# Patient Record
Sex: MALE | Race: BLACK OR AFRICAN AMERICAN
[De-identification: names, ages, dates, MRNs, and addresses within clinical notes are randomized per-mention and may not be internally consistent; named-entity substitution may affect disease eponyms.]

---

## 2020-03-10 ENCOUNTER — HOSPITAL ENCOUNTER (EMERGENCY)
Dept: HOSPITAL 61 - ER | Age: 60
Discharge: HOME | End: 2020-03-10
Payer: MEDICARE

## 2020-03-10 VITALS — DIASTOLIC BLOOD PRESSURE: 100 MMHG | SYSTOLIC BLOOD PRESSURE: 149 MMHG

## 2020-03-10 VITALS — WEIGHT: 195.33 LBS | BODY MASS INDEX: 27.35 KG/M2 | HEIGHT: 71 IN

## 2020-03-10 DIAGNOSIS — Z88.8: ICD-10-CM

## 2020-03-10 DIAGNOSIS — E87.6: ICD-10-CM

## 2020-03-10 DIAGNOSIS — K92.1: ICD-10-CM

## 2020-03-10 DIAGNOSIS — R19.7: Primary | ICD-10-CM

## 2020-03-10 DIAGNOSIS — Z88.5: ICD-10-CM

## 2020-03-10 DIAGNOSIS — R09.81: ICD-10-CM

## 2020-03-10 LAB
ALBUMIN SERPL-MCNC: 3.2 G/DL (ref 3.4–5)
ALBUMIN/GLOB SERPL: 0.8 {RATIO} (ref 1–1.7)
ALP SERPL-CCNC: 65 U/L (ref 46–116)
ALT SERPL-CCNC: 50 U/L (ref 16–63)
ANION GAP SERPL CALC-SCNC: 10 MMOL/L (ref 6–14)
AST SERPL-CCNC: 50 U/L (ref 15–37)
BASOPHILS # BLD AUTO: 0 X10^3/UL (ref 0–0.2)
BASOPHILS NFR BLD: 0 % (ref 0–3)
BILIRUB SERPL-MCNC: 0.8 MG/DL (ref 0.2–1)
BUN SERPL-MCNC: 13 MG/DL (ref 8–26)
BUN/CREAT SERPL: 14 (ref 6–20)
CALCIUM SERPL-MCNC: 9 MG/DL (ref 8.5–10.1)
CHLORIDE SERPL-SCNC: 101 MMOL/L (ref 98–107)
CO2 SERPL-SCNC: 29 MMOL/L (ref 21–32)
CREAT SERPL-MCNC: 0.9 MG/DL (ref 0.7–1.3)
EOSINOPHIL NFR BLD: 0 % (ref 0–3)
EOSINOPHIL NFR BLD: 0 X10^3/UL (ref 0–0.7)
ERYTHROCYTE [DISTWIDTH] IN BLOOD BY AUTOMATED COUNT: 13.5 % (ref 11.5–14.5)
GFR SERPLBLD BASED ON 1.73 SQ M-ARVRAT: 104.2 ML/MIN
GLOBULIN SER-MCNC: 3.8 G/DL (ref 2.2–3.8)
GLUCOSE SERPL-MCNC: 103 MG/DL (ref 70–99)
HCT VFR BLD CALC: 37.4 % (ref 39–53)
HGB BLD-MCNC: 12.7 G/DL (ref 13–17.5)
LIPASE: 123 U/L (ref 73–393)
LYMPHOCYTES # BLD: 1 X10^3/UL (ref 1–4.8)
LYMPHOCYTES NFR BLD AUTO: 22 % (ref 24–48)
MCH RBC QN AUTO: 33 PG (ref 25–35)
MCHC RBC AUTO-ENTMCNC: 34 G/DL (ref 31–37)
MCV RBC AUTO: 97 FL (ref 79–100)
MONO #: 0.7 X10^3/UL (ref 0–1.1)
MONOCYTES NFR BLD: 17 % (ref 0–9)
NEUT #: 2.7 X10^3/UL (ref 1.8–7.7)
NEUTROPHILS NFR BLD AUTO: 61 % (ref 31–73)
PLATELET # BLD AUTO: 140 X10^3/UL (ref 140–400)
POTASSIUM SERPL-SCNC: 3.3 MMOL/L (ref 3.5–5.1)
PROT SERPL-MCNC: 7 G/DL (ref 6.4–8.2)
RBC # BLD AUTO: 3.84 X10^6/UL (ref 4.3–5.7)
SODIUM SERPL-SCNC: 140 MMOL/L (ref 136–145)
WBC # BLD AUTO: 4.4 X10^3/UL (ref 4–11)

## 2020-03-10 PROCEDURE — 71046 X-RAY EXAM CHEST 2 VIEWS: CPT

## 2020-03-10 PROCEDURE — 84484 ASSAY OF TROPONIN QUANT: CPT

## 2020-03-10 PROCEDURE — 36415 COLL VENOUS BLD VENIPUNCTURE: CPT

## 2020-03-10 PROCEDURE — 80053 COMPREHEN METABOLIC PANEL: CPT

## 2020-03-10 PROCEDURE — 85025 COMPLETE CBC W/AUTO DIFF WBC: CPT

## 2020-03-10 PROCEDURE — 83690 ASSAY OF LIPASE: CPT

## 2020-03-10 PROCEDURE — 99284 EMERGENCY DEPT VISIT MOD MDM: CPT

## 2020-03-10 NOTE — PHYS DOC
Adult General


Chief Complaint


Chief Complaint:  DIARRHEA





HPI


HPI





Patient is a 59  year old male who presents with upper respiratory symptoms for 

the last week. Patient states that he started to have some bloody stools 2 week 

ago, which lasted 1 day. States he has not had any further episodes since that 

time. States he is continued has some soft stool, with 12 episodes of diarrhea 

each day. Denies any purulence, denies any blood in his stools. Denies abdominal

pain. Does report he has had heavy EtOH use for the last several years. States 

last drink was yesterday. States he's had some sinus congestion and a cough for 

the last week as well, he had taken Benadryl last night but nothing else. Denies

any shortness of breath, denies any dizziness, denies any chest pain, denies any

abdominal pain, denies any vomiting or nausea.





Review of Systems


Review of Systems





Constitutional: Denies fever or chills []





HENT: Reports nasal congestion denies sore throat []


Respiratory:  Reports cough denies shortness of breath []


Cardiovascular: No additional information not addressed in HPI []


GI: Denies abdominal pain, nausea, vomiting, reports 1 episode of bloody stool 2

 weeks ago, reports diarrhea for the last week as well.[]


: Denies dysuria or hematuria []


Musculoskeletal: Denies back pain or joint pain []


Integument: Denies rash or skin lesions []


Neurologic: Denies headache, focal weakness or sensory changes []


Endocrine: Denies polyuria or polydipsia []





All other systems were reviewed and found to be within normal limits, except as 

documented in this note.





Allergies


Allergies





Allergies








Coded Allergies Type Severity Reaction Last Updated Verified


 


  lorazepam Allergy Severe Anaphylaxis 3/10/20 Yes


 


  morphine Allergy Mild hives 3/10/20 Yes











Physical Exam


Physical Exam





Constitutional: Well developed, well nourished, no acute distress, non-toxic 

appearance. []


HENT: Normocephalic, atraumatic, bilateral external ears normal, oropharynx 

moist, no oral exudates, nose normal. Tonsils 0 []


Eyes: PERRLA, EOMI, conjunctiva normal, no discharge. [] 


Neck: Normal range of motion, no tenderness, supple, no stridor. [] 


Cardiovascular:Heart rate regular rhythm, no murmur []


Lungs & Thorax:  Bilateral breath sounds clear to auscultation []


Abdomen: Bowel sounds normal, soft, no tenderness, no masses, no pulsatile adiel

s. Minimal hepatomegaly noted [] 


Skin: Warm, dry, no erythema, no rash. [] 


Back: No tenderness, no CVA tenderness. [] 


Extremities: No tenderness, no cyanosis, no clubbing, ROM intact, no edema. [] 


Neurologic: Alert and oriented X 3, normal motor function, normal sensory 

function, no focal deficits noted. []


Psychologic: Affect normal, judgement normal, mood normal. []





Current Patient Data


Vital Signs





                                   Vital Signs








  Date Time  Temp Pulse Resp B/P (MAP) Pulse Ox O2 Delivery O2 Flow Rate FiO2


 


3/10/20 08:55 98.3 89 18 163/98 (119) 96 Room Air  





 98.3       








Lab Values





                                Laboratory Tests








Test


 3/10/20


09:25


 


White Blood Count


 4.4 x10^3/uL


(4.0-11.0)


 


Red Blood Count


 3.84 x10^6/uL


(4.30-5.70)  L


 


Hemoglobin


 12.7 g/dL


(13.0-17.5)  L


 


Hematocrit


 37.4 %


(39.0-53.0)  L


 


Mean Corpuscular Volume


 97 fL ()





 


Mean Corpuscular Hemoglobin 33 pg (25-35)  


 


Mean Corpuscular Hemoglobin


Concent 34 g/dL


(31-37)


 


Red Cell Distribution Width


 13.5 %


(11.5-14.5)


 


Platelet Count


 140 x10^3/uL


(140-400)


 


Neutrophils (%) (Auto) 61 % (31-73)  


 


Lymphocytes (%) (Auto) 22 % (24-48)  L


 


Monocytes (%) (Auto) 17 % (0-9)  H


 


Eosinophils (%) (Auto) 0 % (0-3)  


 


Basophils (%) (Auto) 0 % (0-3)  


 


Neutrophils # (Auto)


 2.7 x10^3/uL


(1.8-7.7)


 


Lymphocytes # (Auto)


 1.0 x10^3/uL


(1.0-4.8)


 


Monocytes # (Auto)


 0.7 x10^3/uL


(0.0-1.1)


 


Eosinophils # (Auto)


 0.0 x10^3/uL


(0.0-0.7)


 


Basophils # (Auto)


 0.0 x10^3/uL


(0.0-0.2)


 


Sodium Level


 140 mmol/L


(136-145)


 


Potassium Level


 3.3 mmol/L


(3.5-5.1)  L


 


Chloride Level


 101 mmol/L


()


 


Carbon Dioxide Level


 29 mmol/L


(21-32)


 


Anion Gap 10 (6-14)  


 


Blood Urea Nitrogen


 13 mg/dL


(8-26)


 


Creatinine


 0.9 mg/dL


(0.7-1.3)


 


Estimated GFR


(Cockcroft-Gault) 104.2  





 


BUN/Creatinine Ratio 14 (6-20)  


 


Glucose Level


 103 mg/dL


(70-99)  H


 


Calcium Level


 9.0 mg/dL


(8.5-10.1)


 


Total Bilirubin


 0.8 mg/dL


(0.2-1.0)


 


Aspartate Amino Transferase


(AST) 50 U/L (15-37)


H


 


Alanine Aminotransferase (ALT)


 50 U/L (16-63)





 


Alkaline Phosphatase


 65 U/L


()


 


Troponin I Quantitative


 0.035 ng/mL


(0.000-0.055)


 


Total Protein


 7.0 g/dL


(6.4-8.2)


 


Albumin


 3.2 g/dL


(3.4-5.0)  L


 


Albumin/Globulin Ratio


 0.8 (1.0-1.7)


L


 


Lipase


 123 U/L


()


 


Ethyl Alcohol Level


 < 10 mg/dL


(0-10)





                                Laboratory Tests


3/10/20 09:25








                                Laboratory Tests


3/10/20 09:25











EKG


EKG


[]





Radiology/Procedures


Radiology/Procedures


PA and Lateral Chest radiographs 3/10/2020


 


CLINICAL HISTORY: Cough for one to 2 weeks.


 


PA and lateral digital radiographs of the chest were obtained. No previous


studies are available for comparison. The cardiac silhouette is mildly 


enlarged. The thoracic aorta is mildly tortuous. Mild degenerative changes


are seen involving the thoracic spine and both shoulders.


 


IMPRESSION: No acute abnormality is seen.


 


Electronically signed by: Selvin Osborne MD (3/10/2020 9:51 AM) [id number]








[]





Course & Med Decision Making


Course & Med Decision Making


Pertinent Labs and Imaging studies reviewed. (See chart for details)





[]Discussed findings with patient and family, patient ports easily on better at 

this time, patient and. Family reports they were concerned this patient has not 

had his Risperdal for 3 years, and was wondering if he needs to start that 

again. Advised patient and family that they need to contact his primary care to 

restart this, as restarting a mental health medication the patient has been off 

of for the last 3 years for a patient emergency room is not appropriate. Discu

ssed continue to rest, nutrition. Decreased alcohol use. Family reports they 

have primary care provider at [facility], however they have not contacted patient to 

set up any appointments.





Dragon Disclaimer


Dragon Disclaimer


This electronic medical record was generated, in whole or in part, using a voice

 recognition dictation system.





Departure


Departure


Impression:  


   Primary Impression:  


   Diarrhea


   Additional Impression:  


   Hypokalemia


Disposition:  01 HOME, SELF-CARE


Condition:  STABLE


Referrals:  


UNKNOWN PCP NAME (PCP)


Patient Instructions:  Diarrhea





Additional Instructions:  


As we discussed, try to get into his primary care at [facility] to determine if they 

want to restart him on the medication you discussed. Try to give him a bland 

diet over the next couple days, making sure he continues to eat well and stay 

hydrated.





Problem Qualifiers








   Primary Impression:  


   Diarrhea


   Diarrhea type:  unspecified type  Qualified Codes:  R19.7 - Diarrhea, 

   unspecified








ELMO VALENCIA              Mar 10, 2020 09:16

## 2020-03-10 NOTE — RAD
PA and Lateral Chest radiographs 3/10/2020

 

CLINICAL HISTORY: Cough for one to 2 weeks.

 

PA and lateral digital radiographs of the chest were obtained. No previous

studies are available for comparison. The cardiac silhouette is mildly 

enlarged. The thoracic aorta is mildly tortuous. Mild degenerative changes

are seen involving the thoracic spine and both shoulders.

 

IMPRESSION: No acute abnormality is seen.

 

Electronically signed by: Selvin Osborne MD (3/10/2020 9:51 AM) [id number]

## 2021-06-13 ENCOUNTER — HOSPITAL ENCOUNTER (EMERGENCY)
Dept: HOSPITAL 61 - ER | Age: 61
Discharge: HOME | End: 2021-06-13
Payer: MEDICARE

## 2021-06-13 VITALS — BODY MASS INDEX: 31.58 KG/M2 | WEIGHT: 213.19 LBS | HEIGHT: 69 IN

## 2021-06-13 VITALS — DIASTOLIC BLOOD PRESSURE: 112 MMHG | SYSTOLIC BLOOD PRESSURE: 168 MMHG

## 2021-06-13 DIAGNOSIS — F10.20: ICD-10-CM

## 2021-06-13 DIAGNOSIS — Z88.8: ICD-10-CM

## 2021-06-13 DIAGNOSIS — Z88.5: ICD-10-CM

## 2021-06-13 DIAGNOSIS — I11.0: ICD-10-CM

## 2021-06-13 DIAGNOSIS — I50.9: ICD-10-CM

## 2021-06-13 DIAGNOSIS — F41.9: ICD-10-CM

## 2021-06-13 DIAGNOSIS — I49.9: Primary | ICD-10-CM

## 2021-06-13 DIAGNOSIS — F17.200: ICD-10-CM
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
## 2021-06-13 LAB
ALBUMIN SERPL-MCNC: 3.7 G/DL (ref 3.4–5)
ALBUMIN/GLOB SERPL: 0.8 {RATIO} (ref 1–1.7)
ALP SERPL-CCNC: 83 U/L (ref 46–116)
ALT SERPL-CCNC: 21 U/L (ref 16–63)
ANION GAP SERPL CALC-SCNC: 11 MMOL/L (ref 6–14)
AST SERPL-CCNC: 16 U/L (ref 15–37)
BASOPHILS # BLD AUTO: 0 X10^3/UL (ref 0–0.2)
BASOPHILS NFR BLD: 0 % (ref 0–3)
BILIRUB SERPL-MCNC: 0.7 MG/DL (ref 0.2–1)
BUN SERPL-MCNC: 8 MG/DL (ref 8–26)
BUN/CREAT SERPL: 7 (ref 6–20)
CALCIUM SERPL-MCNC: 9 MG/DL (ref 8.5–10.1)
CHLORIDE SERPL-SCNC: 103 MMOL/L (ref 98–107)
CK SERPL-CCNC: 142 U/L (ref 39–308)
CO2 SERPL-SCNC: 26 MMOL/L (ref 21–32)
CREAT SERPL-MCNC: 1.1 MG/DL (ref 0.7–1.3)
EOSINOPHIL NFR BLD: 0 X10^3/UL (ref 0–0.7)
EOSINOPHIL NFR BLD: 1 % (ref 0–3)
ERYTHROCYTE [DISTWIDTH] IN BLOOD BY AUTOMATED COUNT: 14.8 % (ref 11.5–14.5)
GFR SERPLBLD BASED ON 1.73 SQ M-ARVRAT: 82.3 ML/MIN
GLUCOSE SERPL-MCNC: 120 MG/DL (ref 70–99)
HCT VFR BLD CALC: 40.3 % (ref 39–53)
HGB BLD-MCNC: 13.6 G/DL (ref 13–17.5)
LIPASE: 61 U/L (ref 73–393)
LYMPHOCYTES # BLD: 1.5 X10^3/UL (ref 1–4.8)
LYMPHOCYTES NFR BLD AUTO: 19 % (ref 24–48)
MAGNESIUM SERPL-MCNC: 1.7 MG/DL (ref 1.8–2.4)
MCH RBC QN AUTO: 32 PG (ref 25–35)
MCHC RBC AUTO-ENTMCNC: 34 G/DL (ref 31–37)
MCV RBC AUTO: 95 FL (ref 79–100)
MONO #: 1.1 X10^3/UL (ref 0–1.1)
MONOCYTES NFR BLD: 14 % (ref 0–9)
NEUT #: 5.2 X10^3/UL (ref 1.8–7.7)
NEUTROPHILS NFR BLD AUTO: 66 % (ref 31–73)
PLATELET # BLD AUTO: 194 X10^3/UL (ref 140–400)
POTASSIUM SERPL-SCNC: 3.4 MMOL/L (ref 3.5–5.1)
PROT SERPL-MCNC: 8.2 G/DL (ref 6.4–8.2)
RBC # BLD AUTO: 4.24 X10^6/UL (ref 4.3–5.7)
SODIUM SERPL-SCNC: 140 MMOL/L (ref 136–145)
WBC # BLD AUTO: 7.9 X10^3/UL (ref 4–11)

## 2021-06-13 PROCEDURE — 71045 X-RAY EXAM CHEST 1 VIEW: CPT

## 2021-06-13 PROCEDURE — 80053 COMPREHEN METABOLIC PANEL: CPT

## 2021-06-13 PROCEDURE — 83690 ASSAY OF LIPASE: CPT

## 2021-06-13 PROCEDURE — 84484 ASSAY OF TROPONIN QUANT: CPT

## 2021-06-13 PROCEDURE — 85025 COMPLETE CBC W/AUTO DIFF WBC: CPT

## 2021-06-13 PROCEDURE — 82553 CREATINE MB FRACTION: CPT

## 2021-06-13 PROCEDURE — 93005 ELECTROCARDIOGRAM TRACING: CPT

## 2021-06-13 PROCEDURE — 36415 COLL VENOUS BLD VENIPUNCTURE: CPT

## 2021-06-13 PROCEDURE — 83735 ASSAY OF MAGNESIUM: CPT
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
## 2021-06-13 NOTE — EKG
[facility]

              [address] [place] [address]

Test Date:    2021               Test Time:    09:57:01

Pat Name:     CHARBEL DELGADILLO            Department:   

Patient ID:   [id number]           Room:          

Gender:       M                        Technician:   

:          [id number]               Requested By: RICHIE MONTILLA

Order Number: [id number]           Reading MD:     

                                 Measurements

Intervals                              Axis          

Rate:         84                       P:            41

VT:           164                      QRS:          -34

QRSD:         92                       T:            -75

QT:           412                                    

QTc:          490                                    

                           Interpretive Statements

SINUS RHYTHM

ATRIAL PREMATURE COMPLEX(ES)

ABNORMAL LEFT AXIS DEVIATION

LEFT ANTERIOR FASCICULAR BLOCK

CONSIDER LEFT VENTRICULAR HYPERTROPHY

T ABNORMALITY IN ANTERIOR LEADS

INFEROLATERAL LEADS

PROLONGED QT

ABNORMAL ECG

RI6.01

No previous ECG available for comparison

## 2021-06-13 NOTE — RAD
AP chest x-ray



HISTORY: Chest pain.



FINDINGS: Heart size upper limits normal. There is tortuosity of the thoracic aortic arch which is st
able to prior study. No pneumothorax, pulmonary opacities or pleural effusions. Bones are unremarkabl
e.



IMPRESSION: No acute process. Stable exam.



Electronically signed by: Mak Pinto MD (6/13/2021 10:12 AM) [id number]